# Patient Record
Sex: FEMALE | Race: WHITE | NOT HISPANIC OR LATINO | Employment: OTHER | URBAN - METROPOLITAN AREA
[De-identification: names, ages, dates, MRNs, and addresses within clinical notes are randomized per-mention and may not be internally consistent; named-entity substitution may affect disease eponyms.]

---

## 2018-01-03 LAB
CHOLEST SERPL-MSCNC: 184 MG/DL (ref 0–200)
HDLC SERPL-MCNC: 56 MG/DL (ref 35–70)
LDL/HDL RATIO: 3.3 (ref 1–5)
LDLC SERPL CALC-MCNC: 105 MG/DL (ref 0–100)
NON HDL CHOL (CALC): 128
TRIGL SERPL-MCNC: 129 MG/DL (ref 0–150)

## 2019-03-12 ENCOUNTER — OFFICE VISIT (OUTPATIENT)
Dept: PRIMARY CARE CLINIC | Facility: CLINIC | Age: 79
End: 2019-03-12
Payer: MEDICARE

## 2019-03-12 VITALS
DIASTOLIC BLOOD PRESSURE: 52 MMHG | WEIGHT: 180 LBS | HEART RATE: 65 BPM | BODY MASS INDEX: 28.25 KG/M2 | OXYGEN SATURATION: 99 % | SYSTOLIC BLOOD PRESSURE: 110 MMHG | HEIGHT: 67 IN

## 2019-03-12 DIAGNOSIS — I83.893 VARICOSE VEINS OF BOTH LEGS WITH EDEMA: ICD-10-CM

## 2019-03-12 DIAGNOSIS — E78.00 PURE HYPERCHOLESTEROLEMIA: ICD-10-CM

## 2019-03-12 DIAGNOSIS — I05.9 MITRAL VALVE DISORDER: ICD-10-CM

## 2019-03-12 DIAGNOSIS — I10 BENIGN ESSENTIAL HYPERTENSION: Primary | ICD-10-CM

## 2019-03-12 PROBLEM — R91.1 SOLITARY PULMONARY NODULE: Status: ACTIVE | Noted: 2019-03-12

## 2019-03-12 PROBLEM — M81.0 OSTEOPOROSIS: Status: ACTIVE | Noted: 2019-03-12

## 2019-03-12 PROBLEM — H74.90 DISORDER OF MIDDLE EAR: Status: ACTIVE | Noted: 2019-03-12

## 2019-03-12 PROBLEM — M19.90 OSTEOARTHRITIS: Status: ACTIVE | Noted: 2019-03-12

## 2019-03-12 PROBLEM — I42.9 CARDIOMYOPATHY: Status: ACTIVE | Noted: 2019-03-12

## 2019-03-12 PROBLEM — R92.30 BREAST DENSITY: Status: ACTIVE | Noted: 2019-03-12

## 2019-03-12 PROBLEM — K59.00 CONSTIPATION: Status: ACTIVE | Noted: 2019-03-12

## 2019-03-12 PROBLEM — M94.9 DISORDER OF BONE AND ARTICULAR CARTILAGE: Status: ACTIVE | Noted: 2019-03-12

## 2019-03-12 PROBLEM — G47.33 OBSTRUCTIVE SLEEP APNEA SYNDROME: Status: ACTIVE | Noted: 2019-03-12

## 2019-03-12 PROBLEM — J45.909 ASTHMA: Status: ACTIVE | Noted: 2019-03-12

## 2019-03-12 PROBLEM — M19.049 ARTHRITIS OF HAND: Status: ACTIVE | Noted: 2019-03-12

## 2019-03-12 PROBLEM — R73.02 IMPAIRED GLUCOSE TOLERANCE: Status: ACTIVE | Noted: 2019-03-12

## 2019-03-12 PROBLEM — K21.9 GASTROESOPHAGEAL REFLUX DISEASE: Status: ACTIVE | Noted: 2019-03-12

## 2019-03-12 PROBLEM — D48.5 NEOPLASM OF UNCERTAIN BEHAVIOR OF SKIN: Status: ACTIVE | Noted: 2019-03-12

## 2019-03-12 PROBLEM — E78.2 MIXED HYPERLIPIDEMIA: Status: ACTIVE | Noted: 2019-03-12

## 2019-03-12 PROBLEM — N39.0 URINARY TRACT INFECTIOUS DISEASE: Status: ACTIVE | Noted: 2019-03-12

## 2019-03-12 PROBLEM — M89.9 DISORDER OF BONE AND ARTICULAR CARTILAGE: Status: ACTIVE | Noted: 2019-03-12

## 2019-03-12 PROBLEM — M54.50 LOW BACK PAIN: Status: ACTIVE | Noted: 2019-03-12

## 2019-03-12 PROBLEM — J31.0 CHRONIC RHINITIS: Status: ACTIVE | Noted: 2019-03-12

## 2019-03-12 PROCEDURE — 99214 PR OFFICE/OUTPT VISIT, EST, LEVL IV, 30-39 MIN: ICD-10-PCS | Mod: S$GLB,,, | Performed by: NURSE PRACTITIONER

## 2019-03-12 PROCEDURE — 99214 OFFICE O/P EST MOD 30 MIN: CPT | Mod: S$GLB,,, | Performed by: NURSE PRACTITIONER

## 2019-03-12 RX ORDER — LISINOPRIL 40 MG/1
40 TABLET ORAL DAILY
Qty: 90 TABLET | Refills: 3 | Status: SHIPPED | OUTPATIENT
Start: 2019-03-12

## 2019-03-12 RX ORDER — ATORVASTATIN CALCIUM 20 MG/1
20 TABLET, FILM COATED ORAL DAILY
Qty: 90 TABLET | Refills: 3 | Status: SHIPPED | OUTPATIENT
Start: 2019-03-12

## 2019-03-12 RX ORDER — HYDROCHLOROTHIAZIDE 12.5 MG/1
1 CAPSULE ORAL DAILY
COMMUNITY
End: 2019-03-12 | Stop reason: SDUPTHER

## 2019-03-12 RX ORDER — ATORVASTATIN CALCIUM 20 MG/1
1 TABLET, FILM COATED ORAL DAILY
COMMUNITY
End: 2019-03-12 | Stop reason: SDUPTHER

## 2019-03-12 RX ORDER — HYDROCHLOROTHIAZIDE 12.5 MG/1
12.5 CAPSULE ORAL DAILY
Qty: 90 CAPSULE | Refills: 3 | Status: SHIPPED | OUTPATIENT
Start: 2019-03-12 | End: 2019-04-30 | Stop reason: SDUPTHER

## 2019-03-12 RX ORDER — ALBUTEROL SULFATE 90 UG/1
2 AEROSOL, METERED RESPIRATORY (INHALATION) 4 TIMES DAILY
COMMUNITY

## 2019-03-12 RX ORDER — LISINOPRIL 40 MG/1
1 TABLET ORAL DAILY
COMMUNITY
End: 2019-03-12 | Stop reason: SDUPTHER

## 2019-03-12 NOTE — PROGRESS NOTES
"Subjective:      Chief Complaint: Hyperlipidemia    Vitals:    03/12/19 1339   BP: (!) 110/52   Pulse: 65   SpO2: 99%   Weight: 81.6 kg (180 lb)   Height: 5' 7" (1.702 m)       HPI:  Yara Wagner is a 79 y.o. female who presents today for chronic disease follow-up. C/o left groin discomfort, she reports she was told by gyn it was likely vascular problem- she has been evaluated by Dr Fofana for varicose veins in the past but not this symptoms. It started years ago, no pain or swelling at present    Hypertension  Patient is here for follow-up of HTN   Pt reports good med compliance and denies medication side effects.   Blood pressure  is well controlled at home.    Patient denies chest pain, palpitations, SOB, dizziness    Hyperlipidemia   Patient reports good medication compliance and denies medication side effects.  The patient exercises never.   The patient is known to have coexisting coronary artery disease per pt report- she was evaluated by Dr Murphy and told dhe had CAD and mitral valve deficienacy.       Review of Systems   Constitutional: Negative for appetite change, chills, diaphoresis, fatigue and fever.   HENT: Negative for congestion, sinus pressure, sneezing and sore throat.    Respiratory: Negative for cough and wheezing.    Cardiovascular: Positive for leg swelling (trace, compression hose in place). Negative for chest pain and palpitations.   Gastrointestinal: Negative for abdominal pain.   Genitourinary: Negative for dysuria.   Hematological: Negative for adenopathy.   Psychiatric/Behavioral: Negative for confusion and sleep disturbance. The patient is not nervous/anxious.      Objective:   Physical Exam   Constitutional: She is oriented to person, place, and time. She appears well-developed and well-nourished. No distress.   Neck: Neck supple.   Cardiovascular: Normal rate and regular rhythm.   Pulmonary/Chest: Effort normal and breath sounds normal.   Abdominal: Soft. Bowel sounds are " normal.   Musculoskeletal: She exhibits edema (trace BLE).   Neurological: She is alert and oriented to person, place, and time.   Skin: Skin is warm and dry.   Psychiatric: She has a normal mood and affect. Thought content normal.   Vitals reviewed.    Assessment and Plan     Benign essential hypertension  Comments:  BP at goal  Orders:  -     Discontinue: hydroCHLOROthiazide (MICROZIDE) 12.5 mg capsule; Take 1 capsule (12.5 mg total) by mouth once daily.  Dispense: 90 capsule; Refill: 3  -     lisinopril (PRINIVIL,ZESTRIL) 40 MG tablet; Take 1 tablet (40 mg total) by mouth once daily.  Dispense: 90 tablet; Refill: 3    Pure hypercholesterolemia  Comments:  under care Dr Murphy, continue atorvastatin  Orders:  -     atorvastatin (LIPITOR) 20 MG tablet; Take 1 tablet (20 mg total) by mouth once daily.  Dispense: 90 tablet; Refill: 3    Varicose veins of both legs with edema  Comments:  refer to Dr Fofana for further work up of left groin pain and varicose veins  Orders:  -     Ambulatory referral to Vascular Surgery    Mitral valve disorder  Comments:  keep f/u with Dr Murphy in May          DISCUSSION:  We discussed the above plan and all questions were answered.

## 2019-03-18 ENCOUNTER — TELEPHONE (OUTPATIENT)
Dept: PRIMARY CARE CLINIC | Facility: CLINIC | Age: 79
End: 2019-03-18

## 2019-03-18 NOTE — TELEPHONE ENCOUNTER
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

## 2019-04-18 ENCOUNTER — TELEPHONE (OUTPATIENT)
Dept: PRIMARY CARE CLINIC | Facility: CLINIC | Age: 79
End: 2019-04-18

## 2019-04-18 NOTE — TELEPHONE ENCOUNTER
LORRIE--pt calling stating had a bx of her thyroid --checking for ca--will get results next week---dr stuart is going to try and put in a pacemaker for her uncontrolled afib--but has to be careful due to her very slow hr--if positive for thyroid ca will have to do pacemaker first--has an apt on 05/06/19--does she need to come back and see you with all else going on??

## 2019-04-30 ENCOUNTER — TELEPHONE (OUTPATIENT)
Dept: PRIMARY CARE CLINIC | Facility: CLINIC | Age: 79
End: 2019-04-30

## 2019-04-30 DIAGNOSIS — I10 BENIGN ESSENTIAL HYPERTENSION: ICD-10-CM

## 2019-04-30 RX ORDER — HYDROCHLOROTHIAZIDE 12.5 MG/1
CAPSULE ORAL
Qty: 30 CAPSULE | Refills: 5 | Status: SHIPPED | OUTPATIENT
Start: 2019-04-30 | End: 2019-08-23 | Stop reason: SDUPTHER

## 2019-04-30 NOTE — TELEPHONE ENCOUNTER
----FYI--pt calling stating she is going in for an emergency pacemaker due to severe a fib and slow heart rate--- having to reschedule her apt due to this but she was last in on 03/12/19 for apt and will come in for her apt when she reschedules after this procedure

## 2019-08-23 DIAGNOSIS — I10 BENIGN ESSENTIAL HYPERTENSION: ICD-10-CM

## 2019-08-27 RX ORDER — HYDROCHLOROTHIAZIDE 12.5 MG/1
CAPSULE ORAL
Qty: 90 CAPSULE | Refills: 3 | Status: SHIPPED | OUTPATIENT
Start: 2019-08-27

## 2020-05-20 ENCOUNTER — PATIENT OUTREACH (OUTPATIENT)
Dept: ADMINISTRATIVE | Facility: HOSPITAL | Age: 80
End: 2020-05-20

## 2020-07-21 ENCOUNTER — TELEPHONE (OUTPATIENT)
Dept: PRIMARY CARE CLINIC | Facility: CLINIC | Age: 80
End: 2020-07-21

## 2020-07-21 NOTE — TELEPHONE ENCOUNTER
----- Message from Smita Woo sent at 7/21/2020 12:15 PM CDT -----  Regarding: Pt request to remove pt from appt list, pt has a new provider outside of [facility]  Pt called to request to be removed from your appt request. Pt has a new provider.    Pt can be reached at [phone]      Thank you!

## 2020-07-21 NOTE — TELEPHONE ENCOUNTER
----- Message from Smita Woo sent at 7/21/2020 12:15 PM CDT -----  Regarding: Pt request to remove pt from appt list, pt has a new provider outside of [facility]  Pt called to request to be removed from your appt request. Pt has a new provider.    Pt can be reached at [phone]      Thank you!

## 2024-01-20 NOTE — TELEPHONE ENCOUNTER
Left mess again  For pt to call office--  
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   
Pt returned call to office and she is currently in [facility] with a fib --took her off the lisinopril and put her on a new b/p med with low dose of eliquis due to her leaking mitral valve--her lower extremities are still purple with lg red spots and feet are covered in blisters from the ahumada cl syndrome -states she didn't call earlier because she completely lost her voice --voice is some better but legs are still swollen with blisters --will make apt later after getting out of hospital --hopes top be d/c in a couple days---gina gamboa has been informed of pt message.  
Schedule appt -next available and find out what BP meds she is currently taking 
None known